# Patient Record
Sex: MALE | Race: WHITE | NOT HISPANIC OR LATINO | Employment: UNEMPLOYED | URBAN - METROPOLITAN AREA
[De-identification: names, ages, dates, MRNs, and addresses within clinical notes are randomized per-mention and may not be internally consistent; named-entity substitution may affect disease eponyms.]

---

## 2022-11-16 ENCOUNTER — HOSPITAL ENCOUNTER (EMERGENCY)
Facility: MEDICAL CENTER | Age: 52
End: 2022-11-16
Payer: COMMERCIAL

## 2022-11-16 ENCOUNTER — APPOINTMENT (OUTPATIENT)
Dept: RADIOLOGY | Facility: MEDICAL CENTER | Age: 52
End: 2022-11-16

## 2022-11-16 VITALS
DIASTOLIC BLOOD PRESSURE: 98 MMHG | RESPIRATION RATE: 17 BRPM | TEMPERATURE: 97.5 F | HEART RATE: 124 BPM | OXYGEN SATURATION: 95 % | SYSTOLIC BLOOD PRESSURE: 139 MMHG | WEIGHT: 156.31 LBS | HEIGHT: 70 IN | BODY MASS INDEX: 22.38 KG/M2

## 2022-11-16 LAB — EKG IMPRESSION: NORMAL

## 2022-11-16 PROCEDURE — 302449 STATCHG TRIAGE ONLY (STATISTIC)

## 2022-11-16 PROCEDURE — 93005 ELECTROCARDIOGRAM TRACING: CPT

## 2022-11-17 NOTE — ED NOTES
Second call to room patient. Called patient name to ED lobby, outside, and triage hallway with no response. Will dismiss patient appropriately from the system.

## 2022-11-17 NOTE — ED NOTES
Patient called for room from lino, with no response. Presumed to have left by triage staff. Will attempt to call again.

## 2022-11-17 NOTE — ED TRIAGE NOTES
"Chief Complaint   Patient presents with    Chest Wall Pain     The pt reports a trip and fall at home. The pt fell on left chest. The pt reports hard to breath. Pain is non-radiating. The pt denies loc, head/neck pain, no other deformity noted       Pt ambulatory to triage. Pt A&Ox4, the pt reports drinking 5 beers prior to arrival.     Pt to lobby . Pt educated on alerting staff in changes to condition. Pt verbalized understanding. Protocol chest pain.     BP (!) 139/98   Pulse (!) 124   Temp 36.4 °C (97.5 °F) (Temporal)   Resp 17   Ht 1.778 m (5' 10\")   Wt 70.9 kg (156 lb 4.9 oz)   SpO2 95%   BMI 22.43 kg/m²     "

## 2022-11-19 ENCOUNTER — APPOINTMENT (OUTPATIENT)
Dept: RADIOLOGY | Facility: MEDICAL CENTER | Age: 52
End: 2022-11-19
Attending: EMERGENCY MEDICINE
Payer: COMMERCIAL

## 2022-11-19 ENCOUNTER — HOSPITAL ENCOUNTER (EMERGENCY)
Facility: MEDICAL CENTER | Age: 52
End: 2022-11-19
Attending: EMERGENCY MEDICINE
Payer: COMMERCIAL

## 2022-11-19 VITALS
RESPIRATION RATE: 17 BRPM | DIASTOLIC BLOOD PRESSURE: 89 MMHG | BODY MASS INDEX: 22.9 KG/M2 | TEMPERATURE: 98.7 F | HEIGHT: 70 IN | HEART RATE: 101 BPM | WEIGHT: 160 LBS | OXYGEN SATURATION: 97 % | SYSTOLIC BLOOD PRESSURE: 144 MMHG

## 2022-11-19 DIAGNOSIS — J44.9 CHRONIC OBSTRUCTIVE PULMONARY DISEASE, UNSPECIFIED COPD TYPE (HCC): ICD-10-CM

## 2022-11-19 DIAGNOSIS — F10.930 ALCOHOL WITHDRAWAL SYNDROME WITHOUT COMPLICATION (HCC): ICD-10-CM

## 2022-11-19 DIAGNOSIS — S20.212A CONTUSION OF LEFT CHEST WALL, INITIAL ENCOUNTER: Primary | ICD-10-CM

## 2022-11-19 LAB
ALBUMIN SERPL BCP-MCNC: 4.5 G/DL (ref 3.2–4.9)
ALBUMIN/GLOB SERPL: 1.3 G/DL
ALP SERPL-CCNC: 139 U/L (ref 30–99)
ALT SERPL-CCNC: 13 U/L (ref 2–50)
ANION GAP SERPL CALC-SCNC: 14 MMOL/L (ref 7–16)
AST SERPL-CCNC: 21 U/L (ref 12–45)
BASOPHILS # BLD AUTO: 0.7 % (ref 0–1.8)
BASOPHILS # BLD: 0.15 K/UL (ref 0–0.12)
BILIRUB SERPL-MCNC: 1.1 MG/DL (ref 0.1–1.5)
BUN SERPL-MCNC: 9 MG/DL (ref 8–22)
CALCIUM SERPL-MCNC: 9.6 MG/DL (ref 8.5–10.5)
CHLORIDE SERPL-SCNC: 99 MMOL/L (ref 96–112)
CO2 SERPL-SCNC: 25 MMOL/L (ref 20–33)
CREAT SERPL-MCNC: 0.88 MG/DL (ref 0.5–1.4)
EKG IMPRESSION: NORMAL
EOSINOPHIL # BLD AUTO: 0 K/UL (ref 0–0.51)
EOSINOPHIL NFR BLD: 0 % (ref 0–6.9)
ERYTHROCYTE [DISTWIDTH] IN BLOOD BY AUTOMATED COUNT: 45.3 FL (ref 35.9–50)
GFR SERPLBLD CREATININE-BSD FMLA CKD-EPI: 104 ML/MIN/1.73 M 2
GLOBULIN SER CALC-MCNC: 3.5 G/DL (ref 1.9–3.5)
GLUCOSE SERPL-MCNC: 122 MG/DL (ref 65–99)
HCT VFR BLD AUTO: 49.8 % (ref 42–52)
HGB BLD-MCNC: 16.9 G/DL (ref 14–18)
LYMPHOCYTES # BLD AUTO: 3.1 K/UL (ref 1–4.8)
LYMPHOCYTES NFR BLD: 14.7 % (ref 22–41)
MANUAL DIFF BLD: NORMAL
MCH RBC QN AUTO: 32 PG (ref 27–33)
MCHC RBC AUTO-ENTMCNC: 33.9 G/DL (ref 33.7–35.3)
MCV RBC AUTO: 94.3 FL (ref 81.4–97.8)
MONOCYTES # BLD AUTO: 1.1 K/UL (ref 0–0.85)
MONOCYTES NFR BLD AUTO: 5.2 % (ref 0–13.4)
MORPHOLOGY BLD-IMP: NORMAL
NEUTROPHILS # BLD AUTO: 16.75 K/UL (ref 1.82–7.42)
NEUTROPHILS NFR BLD: 79.4 % (ref 44–72)
NRBC # BLD AUTO: 0 K/UL
NRBC BLD-RTO: 0 /100 WBC
PLATELET # BLD AUTO: 209 K/UL (ref 164–446)
PLATELET BLD QL SMEAR: NORMAL
PMV BLD AUTO: 10.2 FL (ref 9–12.9)
POIKILOCYTOSIS BLD QL SMEAR: NORMAL
POTASSIUM SERPL-SCNC: 4.1 MMOL/L (ref 3.6–5.5)
PROT SERPL-MCNC: 8 G/DL (ref 6–8.2)
RBC # BLD AUTO: 5.28 M/UL (ref 4.7–6.1)
RBC BLD AUTO: PRESENT
SODIUM SERPL-SCNC: 138 MMOL/L (ref 135–145)
SPHEROCYTES BLD QL SMEAR: NORMAL
TROPONIN T SERPL-MCNC: <6 NG/L (ref 6–19)
WBC # BLD AUTO: 21.1 K/UL (ref 4.8–10.8)

## 2022-11-19 PROCEDURE — A9270 NON-COVERED ITEM OR SERVICE: HCPCS | Performed by: EMERGENCY MEDICINE

## 2022-11-19 PROCEDURE — 93005 ELECTROCARDIOGRAM TRACING: CPT

## 2022-11-19 PROCEDURE — 96374 THER/PROPH/DIAG INJ IV PUSH: CPT

## 2022-11-19 PROCEDURE — 94640 AIRWAY INHALATION TREATMENT: CPT

## 2022-11-19 PROCEDURE — 700111 HCHG RX REV CODE 636 W/ 250 OVERRIDE (IP): Performed by: EMERGENCY MEDICINE

## 2022-11-19 PROCEDURE — 700105 HCHG RX REV CODE 258: Performed by: EMERGENCY MEDICINE

## 2022-11-19 PROCEDURE — 93005 ELECTROCARDIOGRAM TRACING: CPT | Performed by: EMERGENCY MEDICINE

## 2022-11-19 PROCEDURE — 85025 COMPLETE CBC W/AUTO DIFF WBC: CPT

## 2022-11-19 PROCEDURE — 99285 EMERGENCY DEPT VISIT HI MDM: CPT

## 2022-11-19 PROCEDURE — 36415 COLL VENOUS BLD VENIPUNCTURE: CPT

## 2022-11-19 PROCEDURE — 71275 CT ANGIOGRAPHY CHEST: CPT

## 2022-11-19 PROCEDURE — 71045 X-RAY EXAM CHEST 1 VIEW: CPT

## 2022-11-19 PROCEDURE — 96375 TX/PRO/DX INJ NEW DRUG ADDON: CPT

## 2022-11-19 PROCEDURE — 80053 COMPREHEN METABOLIC PANEL: CPT

## 2022-11-19 PROCEDURE — 700117 HCHG RX CONTRAST REV CODE 255: Performed by: EMERGENCY MEDICINE

## 2022-11-19 PROCEDURE — 700102 HCHG RX REV CODE 250 W/ 637 OVERRIDE(OP): Performed by: EMERGENCY MEDICINE

## 2022-11-19 PROCEDURE — 85007 BL SMEAR W/DIFF WBC COUNT: CPT

## 2022-11-19 PROCEDURE — 700101 HCHG RX REV CODE 250: Performed by: EMERGENCY MEDICINE

## 2022-11-19 PROCEDURE — 84484 ASSAY OF TROPONIN QUANT: CPT

## 2022-11-19 RX ORDER — MORPHINE SULFATE 4 MG/ML
4 INJECTION INTRAVENOUS ONCE
Status: COMPLETED | OUTPATIENT
Start: 2022-11-19 | End: 2022-11-19

## 2022-11-19 RX ORDER — LORAZEPAM 2 MG/ML
1 INJECTION INTRAMUSCULAR ONCE
Status: COMPLETED | OUTPATIENT
Start: 2022-11-19 | End: 2022-11-19

## 2022-11-19 RX ORDER — METHOCARBAMOL 750 MG/1
1500 TABLET, FILM COATED ORAL 3 TIMES DAILY PRN
Qty: 30 TABLET | Refills: 0 | Status: SHIPPED | OUTPATIENT
Start: 2022-11-19

## 2022-11-19 RX ORDER — KETOROLAC TROMETHAMINE 30 MG/ML
15 INJECTION, SOLUTION INTRAMUSCULAR; INTRAVENOUS ONCE
Status: COMPLETED | OUTPATIENT
Start: 2022-11-19 | End: 2022-11-19

## 2022-11-19 RX ORDER — DIAZEPAM 5 MG/1
5 TABLET ORAL ONCE
Status: COMPLETED | OUTPATIENT
Start: 2022-11-19 | End: 2022-11-19

## 2022-11-19 RX ORDER — IPRATROPIUM BROMIDE AND ALBUTEROL SULFATE 2.5; .5 MG/3ML; MG/3ML
3 SOLUTION RESPIRATORY (INHALATION) ONCE
Status: COMPLETED | OUTPATIENT
Start: 2022-11-19 | End: 2022-11-19

## 2022-11-19 RX ORDER — PREDNISONE 20 MG/1
40 TABLET ORAL ONCE
Status: DISCONTINUED | OUTPATIENT
Start: 2022-11-19 | End: 2022-11-19

## 2022-11-19 RX ORDER — SODIUM CHLORIDE 9 MG/ML
1000 INJECTION, SOLUTION INTRAVENOUS ONCE
Status: COMPLETED | OUTPATIENT
Start: 2022-11-19 | End: 2022-11-19

## 2022-11-19 RX ADMIN — KETOROLAC TROMETHAMINE 15 MG: 30 INJECTION, SOLUTION INTRAMUSCULAR at 06:05

## 2022-11-19 RX ADMIN — LIDOCAINE HYDROCHLORIDE 30 ML: 20 SOLUTION OROPHARYNGEAL at 06:03

## 2022-11-19 RX ADMIN — DIAZEPAM 5 MG: 5 TABLET ORAL at 06:04

## 2022-11-19 RX ADMIN — IOHEXOL 65 ML: 350 INJECTION, SOLUTION INTRAVENOUS at 05:00

## 2022-11-19 RX ADMIN — IPRATROPIUM BROMIDE AND ALBUTEROL SULFATE 3 ML: .5; 2.5 SOLUTION RESPIRATORY (INHALATION) at 04:02

## 2022-11-19 RX ADMIN — MORPHINE SULFATE 4 MG: 4 INJECTION, SOLUTION INTRAMUSCULAR; INTRAVENOUS at 03:49

## 2022-11-19 RX ADMIN — LORAZEPAM 1 MG: 2 INJECTION INTRAMUSCULAR; INTRAVENOUS at 04:47

## 2022-11-19 RX ADMIN — SODIUM CHLORIDE 1000 ML: 9 INJECTION, SOLUTION INTRAVENOUS at 06:11

## 2022-11-19 ASSESSMENT — ENCOUNTER SYMPTOMS
COUGH: 1
VOMITING: 0
FEVER: 0
SHORTNESS OF BREATH: 1

## 2022-11-19 NOTE — ED NOTES
Pt educated on DC instructions, medication and follow up care as directed.  Pt verbalized understanding.  All questions answered.  PT received resources from [facility].  ABC's intact.  VVS.  IV DC, tip intact.  Pt partner taking pt home.

## 2022-11-19 NOTE — ED NOTES
The pt is laying in bed with eyes closed. Breathing is even and unlabored. Vitals stable on the monitor. The pt arousable to voice and becomes oriented. RN to medicate per mar. No symptoms of etoh detox seen at this time

## 2022-11-19 NOTE — ED NOTES
The pt wheeled back to room. The pt stood to pivot onto bed with a steady gait. The pt is seen gaurding and moaning in pain. The pt hooked up to the monitor. The pt reports shortness of breath. Breathing is even and unlabored. Vitals stable on the monitor

## 2022-11-19 NOTE — ED NOTES
The pt is restless sitting in bed. The pt is seen intermittently wincing in pain. Hands are seen having tremors, ERP notified. The pt still tachy on the monitor. Other vitals stable. RN to medicate per mar.

## 2022-11-19 NOTE — ED PROVIDER NOTES
ED Provider Note    Scribed for EARL Akins II* by Kalyan Rausch. 11/19/2022  3:39 AM    Means of Arrival: Walk in  History obtained by: Patient  Limitations: None    CHIEF COMPLAINT  Chief Complaint   Patient presents with    Rib Pain    Shortness of Breath       HPI  Federico Bailey is a 52 y.o. male who presents to the Emergency Department for evaluation of moderate rib pain onset 3 days ago. He has a history of COPD. Federico states that he experienced a ground level fall from five feet 3 days ago primarily striking his left sided ribs. He was ultimately prompted to visit the ED over complaints if persistent rib pain. Associated symptoms include shortness of breath, cough, dyspnea on exertion, and chest wall pain. He denies any vomiting or fever. His rib pain is exacerbated with deep inspiration or coughing. No alleviating factors noted. Federico also notes a history of hypertension for which he is compliant with daily Metoprolol. He reports completing a 2 week course of Prednisone recently, but has been placed on a new steroids regimen.     REVIEW OF SYSTEMS  Review of Systems   Constitutional:  Negative for fever.   Respiratory:  Positive for cough and shortness of breath.         Positive for dyspnea on exertion.    Gastrointestinal:  Negative for vomiting.   Musculoskeletal:         Positive for rib pain and chest wall pain.    All other systems reviewed and are negative.  See HPI for further details.    PAST MEDICAL HISTORY       SOCIAL HISTORY  Social History     Tobacco Use    Smoking status: Every Day     Packs/day: 1.00     Types: Cigarettes    Smokeless tobacco: Never   Vaping Use    Vaping Use: Never used   Substance and Sexual Activity    Alcohol use: Yes     Alcohol/week: 30.0 oz     Types: 50 Cans of beer per week     Comment: Last drink yesterday    Drug use: Never    Sexual activity: None noted       SURGICAL HISTORY  patient denies any surgical history    CURRENT MEDICATIONS  Home  "Medications       Reviewed by Nathalia Quinonez R.N. (Registered Nurse) on 11/19/22 at 0303  Med List Status: Not Addressed     Medication Last Dose Status        Patient Marbin Taking any Medications                           ALLERGIES  Allergies   Allergen Reactions    Amlodipine Swelling       PHYSICAL EXAM  VITAL SIGNS: BP (!) 167/117   Pulse (!) 142   Temp 36.7 °C (98.1 °F) (Temporal)   Resp 18   Ht 1.778 m (5' 10\")   Wt 72.6 kg (160 lb)   SpO2 93%   BMI 22.96 kg/m²     Pulse ox interpretation: I interpret this pulse ox as normal.  Constitutional: Alert, Tremulous middle aged, appears to be in pain  HENT: No signs of trauma, Bilateral external ears normal, Nose normal.   Eyes: Pupils are equal, Conjunctiva normal, Non-icteric.   Neck: Normal range of motion, No tenderness, Supple, No stridor.   Cardiovascular: Tachycardic, Regular rhythm, no murmurs. Symmetric distal pulses. No cyanosis of extremities. No peripheral edema of extremities.  Thorax & Lungs: Diminished breath sounds bilaterally, unable to take a deep breath which is limiting exam,  Left anterior lower chest wall tenderness. No crepitus.  Abdomen: Soft, No tenderness, No masses, No pulsatile masses. No peritoneal signs.  Skin: Warm, Dry, No erythema, No rash.   Back: No midline bony tenderness, No CVA tenderness.   Musculoskeletal: Good range of motion in all major joints. No tenderness to palpation or major deformities noted.   Neurologic: Alert , Normal motor function, Normal sensory function, No focal deficits noted.   Psychiatric: Affect normal, Judgment normal, Mood normal.     DIAGNOSTIC STUDIES / PROCEDURES    EKG Interpretation:  Interpreted by me as noted below  Results for orders placed or performed during the hospital encounter of 11/19/22   EKG   Result Value Ref Range    Report       [facility] Emergency Dept.    Test Date:  2022-11-19  Pt Name:    ANGEL GREENE                   Department: ER  MRN:        " [id number]                      Room:  Gender:     Male                         Technician: [id number]  :        1970                   Requested By:ER TRIAGE PROTOCOL  Order #:    [id number]                    Reading MD: Kem De La Rosa II, MD    Measurements  Intervals                                Axis  Rate:       118                          P:          89  CO:         118                          QRS:        0  QRSD:       91                           T:          65  QT:         320  QTc:        449    Interpretive Statements  Sinus tachycardia  Ventricular premature complex  Consider right atrial enlargement  Markedly posterior QRS axis  Nonspecific T abnormalities, lateral leads  Impression: Sinus tachycardia, similar to previous. Artifact present  Compared to ECG 2022 19:33:25  Ventricular premature complex(es) now present  Po sterior QRS axis now present  T-wave abnormality now present  Electronically Signed On 2022 7:00:03 PST by Kem De La Rosa II, MD       LABS  Pertinent Labs & Imaging studies reviewed. (See chart for details)    RADIOLOGY  CT-CTA CHEST PULMONARY ARTERY W/ RECONS   Final Result      1.  No CT evidence of pulmonary embolism.      2.  There is significant emphysema.            DX-CHEST-PORTABLE (1 VIEW)   Final Result      1.  Minimal left basilar atelectasis      2.  No other finding        Pertinent Labs & Imaging studies reviewed. (See chart for details)    COURSE & MEDICAL DECISION MAKING  Pertinent Labs & Imaging studies reviewed. (See chart for details)    3:39 AM This is a 52 y.o. male who presents with left sided rib pain and the differential diagnosis includes but is not limited to rib fracture, musculoskeletal pain, COPD exacerbation, MI, chest wall contusion. Orders per nursing protocol include EKG, Troponins NOW, CMP, CBC with diff, and DX-Chest to evaluate. Patient will be treated with Duoneb nebulizer and Morphine for shortness of breath and rib pain.  Discussed utilizing labs and imaging to further evaluate the patient, he is amenable to the plan of care.     4:29 AM - Ordered CT-CTA Chest Pulmonary Artery W/Recons to rule out a possible blood clot. Tachycardia, unexplained pain at left anterior left lower chest pain. CT also will rule out fractures, pneumothorax.     4:38 AM - Nursing staff informed me that the patient continues to be restless following the administration of medication. Patient was reevaluated at bedside. Discussed lab and radiology results with the patient and informed them that there is no evidence of rib fracture and plan for CT. WBC 21. He admits to consuming alcohol on a daily basis but has been reducing his intake recently, this could be concerning for alcohol withdrawal. Ordered Ativan 1 mg to treat his tremors and tachycardia.      5:35 AM - CT imaging did not reveal any pulmonary embolisms. It did indicate significant emphysema. No rib fractures.     5:42 AM - Patient was reevaluated at bedside, he is resting in bed but still has a notable tremor. Explained lab and radiology results with the patient and informed them that there is no evidence of PE. His white blood cell count is elevated at 21.1, but this could be secondary to recent courses of steroids, which he later told us. He has not had fevers, chills, abdominal pain or other infectious symptoms. Ordered Valium 5 mg to treat his tremors    5:50 AM - Spoke with Radiology about the patient's CT imaging. They reviewed the scan and relayed that there could be and old fracture to the left ribs, but there does not appear to be anything new. Ordered Toradol 15 mg to treat his rib pain.     6:08 AM - Ordered NS Bolus Infusion 1000 mL to treat for tachycardia.    6:28 AM - Patient was reevaluated at bedside, his tremor has markedly improved, heart rate going down following the infusion of fluids. I offered resources for alcohol detoxification and he was interested. Will have [facility]  come and speak to the patient. Pain likely due to chest wall contusion. Prescribed robaxin to help with muscle spasm related to pain.     The patient will return for worsening symptoms and is stable at the time of discharge. The patient verbalizes understanding and will comply. Guidance provided on appropriate use of medications.      FINAL IMPRESSION  1. Contusion of left chest wall, initial encounter    2. Alcohol withdrawal syndrome without complication (HCC)    3. Chronic obstructive pulmonary disease, unspecified COPD type (HCC)          Kalyan TREADWELL (Scribe), am scribing for, and in the presence of, ANTONY Akins II.    Electronically signed by: Kalyan Rausch (Scribe), 11/19/2022    IKem II, M* personally performed the services described in this documentation, as scribed by Kalyan Rausch in my presence, and it is both accurate and complete.    The note accurately reflects work and decisions made by me.  Kem De La Rosa II, M.D.  11/19/2022  7:12 AM

## 2022-11-19 NOTE — ED TRIAGE NOTES
Federico Bailey  52 y.o.  Chief Complaint   Patient presents with    Rib Pain    Shortness of Breath     Ambulatory to lobby with steady gait, pt reporting a fall 3 days ago from 5 feet with L sided rib pain. Pt also reporting sciatica pain down left side. Pt restless in triage, unable to stay still. Speaks in full sentences, A&Ox4, protocol in place.